# Patient Record
(demographics unavailable — no encounter records)

---

## 2024-11-20 NOTE — PLAN
[FreeTextEntry1] :  27 year old female presents for f/u.  -Recommends Mirena IUD, discussed affects perforation, migration, bleeding profile with Mirena IUD -Addressed pt's concerns about increased hair growth and acne -Rx Slynd  RTO for IUD insertion.  Hailey España MD

## 2024-11-20 NOTE — HISTORY OF PRESENT ILLNESS
[FreeTextEntry1] :  27 year old female presents for f/u. Pt has been thinking about restarting IUD and would like to discuss. Reports 2 previous IUDs. States first IUD was displaced and removed, and 2nd IUD(Mirena) was fine. Notes period stopped with Mirena IUD. Concerned about bleeding after IUD insertion - noting period level bleeding for a full month after first insertion. Currently taking Slynd. Desires IUD, but doesn't want IUD to increase hair growth or acne.  Notes PCP did bloodwork over summer and advised GYN go over results. Pt doesn't know where she was on cycle when bloodwork was done. States difficult to track periods due to Slynd. Started Ozempic 2 months ago. Notes sprained/possibly fractured ankle due to hiking.  PMH: obesity, anxiety  PSHx: ACL reconstruction surgery  Meds: Slynd All: Ibuprofen (Hives)

## 2024-11-20 NOTE — END OF VISIT
[FreeTextEntry3] : I, Suzan Yang, acted as a scribe on behalf of Dr. Hailey España M.D. on 11/20/2024.   All medical entries made by the scribe were at my, Dr. Hailey España M.D., direction and personally dictated by me on 11/20/2024. I have reviewed the chart and agree that the record accurately reflects my personal performance of the history, physical exam, assessment and plan. I have also personally directed, reviewed, and agreed with the chart.